# Patient Record
Sex: MALE | Race: WHITE | NOT HISPANIC OR LATINO | URBAN - METROPOLITAN AREA
[De-identification: names, ages, dates, MRNs, and addresses within clinical notes are randomized per-mention and may not be internally consistent; named-entity substitution may affect disease eponyms.]

---

## 2017-04-05 ENCOUNTER — IMPORTED ENCOUNTER (OUTPATIENT)
Dept: URBAN - METROPOLITAN AREA CLINIC 31 | Facility: CLINIC | Age: 82
End: 2017-04-05

## 2017-04-05 PROBLEM — H10.023: Noted: 2017-04-05

## 2017-04-05 PROCEDURE — 99214 OFFICE O/P EST MOD 30 MIN: CPT

## 2017-04-05 NOTE — PATIENT DISCUSSION
1.  Bacterial Conjunctivitis OU -- both lids are infected and OD lid lateral canthus is red and open sore. The condition was discussed with the patient. The mechanism of transmission was explained. The patient was advised to wash his hands and use separate towels and bedding. Rx Polytrim qid ou and bacitracin quiana to lids at bedtime and in corner of right eyelid.   2.  RTN 2 weeks for scheduled CE.

## 2017-04-18 ENCOUNTER — IMPORTED ENCOUNTER (OUTPATIENT)
Dept: URBAN - METROPOLITAN AREA CLINIC 31 | Facility: CLINIC | Age: 82
End: 2017-04-18

## 2017-04-18 PROBLEM — Z96.1: Noted: 2017-04-18

## 2017-04-18 PROBLEM — H43.391: Noted: 2017-04-18

## 2017-04-18 PROCEDURE — 92014 COMPRE OPH EXAM EST PT 1/>: CPT

## 2017-04-18 PROCEDURE — 92015 DETERMINE REFRACTIVE STATE: CPT

## 2017-04-18 NOTE — PATIENT DISCUSSION
1.  Psuedophakia OD - IOL stable. Monitor. 2. Floaters OD:  Patient was cautioned to call our office immediately if they experience a substantial change in their symptoms such as an increase in floaters persistent flashes loss of visual field (may appear as a shadow or a curtain) or decrease in visual acuity as these may indicate a retinal tear or detachment. If this is a new problem patient will need to return for re-examination  as determined by the [address] 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 Hx TIA. BP has been fine. 6.  Allergies/Dryness-  use theratears every day bid. 7.  NO change rx right eye.  make tint darker. 8.  Bacterial infection ou-  doing better. Cont with Polytrim bid till gone and eryth quiana to lids bedtime and lateral canthus. 9.  RTN 2 weeks for FU on infection. 10. Evan Tristan Return for an appointment in 6 months for DFTA.

## 2017-05-02 ENCOUNTER — IMPORTED ENCOUNTER (OUTPATIENT)
Dept: URBAN - METROPOLITAN AREA CLINIC 31 | Facility: CLINIC | Age: 82
End: 2017-05-02

## 2017-05-02 PROBLEM — H10.023: Noted: 2017-05-02

## 2017-05-02 PROCEDURE — 99213 OFFICE O/P EST LOW 20 MIN: CPT

## 2017-05-02 NOTE — PATIENT DISCUSSION
1.  Bacterial Conjunctivitis OU -- better. The condition was discussed with the patient. The mechanism of transmission was explained. The patient was advised to wash his hands and use separate towels and bedding. Finish Polytrim bid 5 days then DC. Use Bacitracin quiana to lids at bedtime and in corner of right eyelid 5 dasy then DC.    2.  RTN Oct CE

## 2017-11-07 ENCOUNTER — IMPORTED ENCOUNTER (OUTPATIENT)
Dept: URBAN - METROPOLITAN AREA CLINIC 31 | Facility: CLINIC | Age: 82
End: 2017-11-07

## 2017-11-07 PROBLEM — H43.391: Noted: 2017-11-07

## 2017-11-07 PROBLEM — Z96.1: Noted: 2017-11-07

## 2017-11-07 PROCEDURE — 92015 DETERMINE REFRACTIVE STATE: CPT

## 2017-11-07 PROCEDURE — 92014 COMPRE OPH EXAM EST PT 1/>: CPT

## 2017-11-07 NOTE — PATIENT DISCUSSION
1.  Psuedophakia OD - IOL stable. Open capsule Monitor. 2. Floaters OD:  Better per pt. Patient was cautioned to call our office immediately if they experience a substantial change in their symptoms such as an increase in floaters persistent flashes loss of visual field3. 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 Hx TIA. BP has been fine. 6.  Allergies/Dryness-  use theratears every day 2-3x per day. .  7.  NO change rx right eye.  make tint darker. 8.  Blepharitis-  Better today. pt has quiana if needed but not using.   9.  RTN 6 mths DFTA

## 2018-05-07 ENCOUNTER — IMPORTED ENCOUNTER (OUTPATIENT)
Dept: URBAN - METROPOLITAN AREA CLINIC 31 | Facility: CLINIC | Age: 83
End: 2018-05-07

## 2018-05-07 PROBLEM — Z96.1: Noted: 2018-05-07

## 2018-05-07 PROBLEM — H43.391: Noted: 2018-05-07

## 2018-05-07 PROCEDURE — 92014 COMPRE OPH EXAM EST PT 1/>: CPT

## 2018-05-07 PROCEDURE — 92015 DETERMINE REFRACTIVE STATE: CPT

## 2018-05-07 NOTE — PATIENT DISCUSSION
1.  Psuedophakia OD - IOL stable. Open capsule Monitor. 2. Floaters OD:  Better per pt. Patient was cautioned to call our office immediately if they experience a substantial change in their symptoms such as an increase in floaters persistent flashes loss of visual field3. 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 Hx TIA. BP has been fine. 6.  Allergies/Dryness-  use theratears every day 2-3x per day. .  7.  NO change rx right eye.  make tint darker. 8.  Blepharitis-  Better today. Rx eryth quiana qhs OS to lids.    9.  RTN 6 mths DFTA

## 2018-11-06 ENCOUNTER — IMPORTED ENCOUNTER (OUTPATIENT)
Dept: URBAN - METROPOLITAN AREA CLINIC 31 | Facility: CLINIC | Age: 83
End: 2018-11-06

## 2018-11-06 PROBLEM — Z96.1: Noted: 2018-11-06

## 2018-11-06 PROBLEM — H43.391: Noted: 2018-11-06

## 2018-11-06 PROCEDURE — 99214 OFFICE O/P EST MOD 30 MIN: CPT

## 2018-11-06 NOTE — PATIENT DISCUSSION
1.  Pseudophakia OD - IOL stable. Open capsule Monitor. 2. Floaters OD:  Better per pt. Patient was cautioned to call our office immediately if they experience a substantial change in their symptoms such as an increase in floaters persistent flashes loss of visual field3. 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 Hx TIA. BP has been fine. 6.  Allergies/Dryness-  use theratears every day 2-3x per day. .  7.  NO change rx right eye.  make tint darker. 8.  Blepharitis-  Better today. Rx eryth quiana qhs OS to lids.    9.  RTN 6 mths CE

## 2019-01-11 ENCOUNTER — IMPORTED ENCOUNTER (OUTPATIENT)
Dept: URBAN - METROPOLITAN AREA CLINIC 31 | Facility: CLINIC | Age: 84
End: 2019-01-11

## 2019-01-11 PROBLEM — H10.021: Noted: 2019-01-11

## 2019-01-11 PROCEDURE — 99214 OFFICE O/P EST MOD 30 MIN: CPT

## 2019-01-11 NOTE — PATIENT DISCUSSION
1.  Bacterial Conjunctivitis OD --The condition was discussed with the patient. The mechanism of transmission was explained. The patient was advised to wash his hands and use separate towels and bedding. Rx TDX tid OD and keep clean. 2.   RTN 10 days OC

## 2019-01-21 ENCOUNTER — IMPORTED ENCOUNTER (OUTPATIENT)
Dept: URBAN - METROPOLITAN AREA CLINIC 31 | Facility: CLINIC | Age: 84
End: 2019-01-21

## 2019-01-21 PROBLEM — H40.051: Noted: 2019-01-21

## 2019-01-21 PROBLEM — H10.021: Noted: 2019-01-21

## 2019-01-21 PROCEDURE — 99213 OFFICE O/P EST LOW 20 MIN: CPT

## 2019-01-21 NOTE — PATIENT DISCUSSION
1.  Ocular HTN OD:  Possibly steroid responder. Elevated intraocular pressure without signs of glaucomatous damage to the optic nerve. Will continue to monitor. 2. Bacterial Conjunctivitis OD --  better. The condition was discussed with the patient. The mechanism of transmission was explained. The patient was advised to wash his hands and use separate towels and bedding. Finish TDX bid OD 5 days then DC. .2. RTN 1 mth tack--increase possibly from steroid.

## 2019-02-21 ENCOUNTER — IMPORTED ENCOUNTER (OUTPATIENT)
Dept: URBAN - METROPOLITAN AREA CLINIC 31 | Facility: CLINIC | Age: 84
End: 2019-02-21

## 2019-02-21 PROBLEM — H10.021: Noted: 2019-02-21

## 2019-02-21 PROBLEM — H40.051: Noted: 2019-02-21

## 2019-02-21 PROBLEM — Z96.1: Noted: 2019-02-21

## 2019-02-21 PROBLEM — H43.391: Noted: 2019-02-21

## 2019-02-21 PROCEDURE — 99213 OFFICE O/P EST LOW 20 MIN: CPT

## 2019-02-21 NOTE — PATIENT DISCUSSION
1.  Pseudophakia OD - IOL stable. Open capsule Monitor. 2. Floaters OD:  Better per pt. Patient was cautioned to call our office immediately if they experience a substantial change in their symptoms such as an increase in floaters persistent flashes loss of visual field3. 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 Hx TIA. BP has been fine. 6.  Allergies/Dryness-  use theratears every day 2-3x per day. .  7.  NO change rx right eye.  make tint darker. 8.  Blepharitis-  Better today. Rx eryth quiana qhs OS to lids. 9. Ocular HTN OD:  Possibly steroid responder. Elevated intraocular pressure without signs of glaucomatous damage to the optic nerve. Will continue to monitor. Pt had 2 injectiosn in past week for back. 10.  Bacterial Conjunctivitis OD --  resolved--.11.  RTN 1 mth tack/VF--increase possibly from steroid. 12.  RTN 11/19 CE

## 2019-04-05 ENCOUNTER — IMPORTED ENCOUNTER (OUTPATIENT)
Dept: URBAN - METROPOLITAN AREA CLINIC 31 | Facility: CLINIC | Age: 84
End: 2019-04-05

## 2019-04-05 PROBLEM — H10.021: Noted: 2019-04-05

## 2019-04-05 PROBLEM — H40.051: Noted: 2019-04-05

## 2019-04-05 PROBLEM — H43.391: Noted: 2019-04-05

## 2019-04-05 PROBLEM — H04.123: Noted: 2019-04-05

## 2019-04-05 PROBLEM — Z96.1: Noted: 2019-04-05

## 2019-04-05 PROCEDURE — 99213 OFFICE O/P EST LOW 20 MIN: CPT

## 2019-04-05 PROCEDURE — 92083 EXTENDED VISUAL FIELD XM: CPT

## 2019-04-05 NOTE — PATIENT DISCUSSION
1.  Dry Eyes OU:  Start artificials tears bid or more. Encouraged regular use. 2.  Pseudophakia OD - IOL stable. Open capsule Monitor. 2. Floaters OD:  Better per pt. Patient was cautioned to call our office immediately if they experience a substantial change in their symptoms such as an increase in floaters persistent flashes loss of visual field3. 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 Hx TIA. BP has been fine. 6.  Allergies/Dryness-  use theratears every day 2-3x per day. .  7.  NO change rx right eye.  make tint darker. 8.  Blepharitis-  Better today. Rx eryth quiana qhs OS to lids. 9. Ocular HTN OD:  Possibly steroid responder. Elevated intraocular pressure without signs of glaucomatous damage to the optic nerve. Will continue to monitor. Pt had 2 injectiosn in past week for back. VF OD inf scattered- non specific. 10.  Bacterial Conjunctivitis OD --  resolved--.11.  RTN 1 mth  CE-- OCt in fall.  - increase from injection steroid. 12.  RTN 11/19  DF/OCT

## 2019-05-01 ENCOUNTER — IMPORTED ENCOUNTER (OUTPATIENT)
Dept: URBAN - METROPOLITAN AREA CLINIC 31 | Facility: CLINIC | Age: 84
End: 2019-05-01

## 2019-05-01 PROBLEM — H43.391: Noted: 2019-05-01

## 2019-05-01 PROBLEM — H04.123: Noted: 2019-05-01

## 2019-05-01 PROBLEM — H40.051: Noted: 2019-05-01

## 2019-05-01 PROBLEM — Z96.1: Noted: 2019-05-01

## 2019-05-01 PROBLEM — H10.021: Noted: 2019-05-01

## 2019-05-01 PROCEDURE — 92015 DETERMINE REFRACTIVE STATE: CPT

## 2019-05-01 PROCEDURE — 92014 COMPRE OPH EXAM EST PT 1/>: CPT

## 2019-05-01 PROCEDURE — 92250 FUNDUS PHOTOGRAPHY W/I&R: CPT

## 2019-05-01 NOTE — PATIENT DISCUSSION
1.  Dry Eyes OU:  Cont artificials tears bid or more. Encouraged regular use. 2.  Pseudophakia OD - IOL stable. Open capsule Monitor. 2. Floaters OD:  Better per pt. Patient was cautioned to call our office immediately if they experience a substantial change in their symptoms such as an increase in floaters persistent flashes loss of visual field3. 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 Hx TIA. BP has been fine. 6.  Allergies/Dryness-  use theratears every day 2-3x per day. .  7.  NO change rx right eye.  make tint darker. 8.  Blepharitis-  Better today. wash lids bid. Can use eryth quiana qhs OS to lids. 9. Ocular HTN OD:  Possibly steroid responder. Elevated intraocular pressure without signs of glaucomatous damage to the optic nerve. Will continue to monitor. Pt had 2 injectiosn in past week for back. VF OD 4/5/19   inf scattered- non specific.  10. RTN 11/19  DF/OCT

## 2019-12-03 ENCOUNTER — IMPORTED ENCOUNTER (OUTPATIENT)
Dept: URBAN - METROPOLITAN AREA CLINIC 31 | Facility: CLINIC | Age: 84
End: 2019-12-03

## 2019-12-03 PROBLEM — H04.123: Noted: 2019-12-03

## 2019-12-03 PROBLEM — H40.051: Noted: 2019-12-03

## 2019-12-03 PROBLEM — H43.391: Noted: 2019-12-03

## 2019-12-03 PROBLEM — Z96.1: Noted: 2019-12-03

## 2019-12-03 PROBLEM — H10.021: Noted: 2019-12-03

## 2019-12-03 PROCEDURE — 99214 OFFICE O/P EST MOD 30 MIN: CPT

## 2019-12-03 PROCEDURE — 92133 CPTRZD OPH DX IMG PST SGM ON: CPT

## 2019-12-03 NOTE — PATIENT DISCUSSION
1.  Dry Eyes OU:  Cont artificials tears bid or more. Encouraged regular use. 2.  Pseudophakia OD - IOL stable. Open capsule Monitor. 2. Floaters OD:  Better per pt. Patient was cautioned to call our office immediately if they experience a substantial change in their symptoms such as an increase in floaters persistent flashes loss of visual field3. 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 Hx TIA. BP has been fine. 6.  Allergies/Dryness-  use theratears every day 2-3x per day. .  7.  NO change rx right eye.  make tint darker. 8.  Blepharitis-  Better today. wash lids bid. Can use eryth quiana qhs OS to lids. 9. Ocular HTN OD:   ONH damage--  OCT 12/3/19 moderate thinning oD. RNFL 66. Poor GCC . Possibly steroid responder. Elevated intraocular pressure with signs of glaucomatous damage to the optic nerve. Rx Latanprost qhs OD. Pt had 2 injectiosn in past week for back. VF OD 4/5/19   inf scattered- non specific. 10. RTN 1 mth tack11.   RTN 5/20  CE/VF/optos --derick FU on ONH and glc

## 2020-01-07 ENCOUNTER — IMPORTED ENCOUNTER (OUTPATIENT)
Dept: URBAN - METROPOLITAN AREA CLINIC 31 | Facility: CLINIC | Age: 85
End: 2020-01-07

## 2020-01-07 PROBLEM — H43.391: Noted: 2020-01-07

## 2020-01-07 PROBLEM — Z96.1: Noted: 2020-01-07

## 2020-01-07 PROBLEM — H10.021: Noted: 2020-01-07

## 2020-01-07 PROBLEM — H40.051: Noted: 2020-01-07

## 2020-01-07 PROBLEM — H04.123: Noted: 2020-01-07

## 2020-01-07 PROCEDURE — 99213 OFFICE O/P EST LOW 20 MIN: CPT

## 2020-01-07 NOTE — PATIENT DISCUSSION
1.  Dry Eyes OU:  Cont artificials tears bid or more. Encouraged regular use. Can try Zaditor bid for tearing. 2.  Pseudophakia OD - IOL stable. Open capsule Monitor. 2. Floaters OD:  Better per pt. Patient was cautioned to call our office immediately if they experience a substantial change in their symptoms such as an increase in floaters persistent flashes loss of visual field3. 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 Hx TIA. BP has been fine. 6.  Allergies/Dryness-  use theratears every day 2-3x per day. .  7.  NO change rx right eye.  make tint darker. 8.  Blepharitis-  Better today. wash lids bid. Can use eryth quiana qhs OS to lids. 9. Ocular HTN OD:  IOP better with Latanaprost down to 20 from 28. ONH damage--  OCT 12/3/19 moderate thinning oD. RNFL 66. Poor GCC . Possibly steroid responder. Elevated intraocular pressure with signs of glaucomatous damage to the optic nerve. Cont Latanprost qhs OD. Pt had 2 injectiosn in past week for back. VF OD 4/5/19   inf scattered- non specific.  10.   RTN 5/20  CE/VF/optos --derick FU on ONH and glc

## 2020-05-12 ENCOUNTER — IMPORTED ENCOUNTER (OUTPATIENT)
Dept: URBAN - METROPOLITAN AREA CLINIC 31 | Facility: CLINIC | Age: 85
End: 2020-05-12

## 2020-05-12 PROBLEM — H40.051: Noted: 2020-05-12

## 2020-05-12 PROBLEM — Z96.1: Noted: 2020-05-12

## 2020-05-12 PROBLEM — H43.391: Noted: 2020-05-12

## 2020-05-12 PROBLEM — H04.123: Noted: 2020-05-12

## 2020-05-12 PROBLEM — H01.004: Noted: 2020-05-12

## 2020-05-12 PROBLEM — H10.021: Noted: 2020-05-12

## 2020-05-12 PROBLEM — H01.002: Noted: 2020-05-12

## 2020-05-12 PROBLEM — H01.005: Noted: 2020-05-12

## 2020-05-12 PROBLEM — H01.001: Noted: 2020-05-12

## 2020-05-12 PROCEDURE — 92014 COMPRE OPH EXAM EST PT 1/>: CPT

## 2020-05-12 PROCEDURE — 92015 DETERMINE REFRACTIVE STATE: CPT

## 2020-05-12 PROCEDURE — 92250 FUNDUS PHOTOGRAPHY W/I&R: CPT

## 2020-05-12 PROCEDURE — 92083 EXTENDED VISUAL FIELD XM: CPT

## 2020-05-12 NOTE — PATIENT DISCUSSION
Blepharitis anterior type OU - The patient exhibits reddened crusty eyelid margins. Warm compresses and lid scrubs were recommended. Antibiotic quiana to lid margin qhs. Artificial Tears to be used as needed for discomfort.

## 2020-05-12 NOTE — PATIENT DISCUSSION
1.  Dry Eyes OU:  Cont artificials tears bid or more. Encouraged regular use. Can try Zaditor bid for tearing. 2.  Pseudophakia OD - IOL stable. Open capsule Monitor. 2. Floaters OD:  Better per pt. Patient was cautioned to call our office immediately if they experience a substantial change in their symptoms such as an increase in floaters persistent flashes loss of visual field3. 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 Hx TIA. BP has been fine. 6.  Allergies/Dryness-  use theratears every day 2-3x per day. .  7.  NO change rx right eye.  make tint darker. 8.  Blepharitis-   wash lids bid. RX eryth quiana qhs OU to lids. 9. Ocular HTN OD:  IOP better with Latanaprost down to 20 from 28. ONH damage--  OCT 12/3/19 moderate thinning oD. RNFL 66. Poor GCC . Possibly steroid responder. Elevated intraocular pressure with signs of glaucomatous damage to the optic nerve. Cont Latanprost qhs OD. Pt had 2 injectiosn in past week for back. VF OD 5/12/20   inf scattered- non specific. 10.   RTN 11/20  DF/OCT [id number].  RTN 1 yr  CE/VF/optos --derick FU on ONH and glc

## 2020-11-20 ENCOUNTER — IMPORTED ENCOUNTER (OUTPATIENT)
Dept: URBAN - METROPOLITAN AREA CLINIC 31 | Facility: CLINIC | Age: 85
End: 2020-11-20

## 2020-11-20 PROBLEM — H10.021: Noted: 2020-11-20

## 2020-11-20 PROBLEM — H04.123: Noted: 2020-11-20

## 2020-11-20 PROBLEM — H43.391: Noted: 2020-11-20

## 2020-11-20 PROBLEM — H40.051: Noted: 2020-11-20

## 2020-11-20 PROBLEM — Z96.1: Noted: 2020-11-20

## 2020-11-20 PROCEDURE — 99214 OFFICE O/P EST MOD 30 MIN: CPT

## 2020-11-20 PROCEDURE — 92133 CPTRZD OPH DX IMG PST SGM ON: CPT

## 2020-11-20 NOTE — PATIENT DISCUSSION
1.  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 Zaditor bid. Hx Dry Eyes/Allergies-- OU:  Encouraged regular use. 2.  Pseudophakia OD - IOL stable. Open capsule Monitor. 3. Floaters OD:  Better per pt. Patient was cautioned to call our office immediately if they experience a substantial change in their symptoms such as an increase in floaters persistent flashes loss of visual field4. Pthisis Bulbi OS- from trauma age 9.7.  s/p Pterygium excision OD 6/046. Hx TIA. BP has been fine. 7.  NO change rx right eye.  make tint darker. 8.  Blepharitis-   wash lids bid. RX eryth quiana qhs OU to lids. 9. Ocular HTN OD:  IOP still elevated with Latanaprost.  24 today. ONH damage--  OCT 11/20/20 thinning oD. RNFL 66. Poor GCC . Possibly steroid responder. Elevated intraocular pressure with signs of glaucomatous damage to the optic nerve. Change rx to Timoptic .5% q12h OD.  VF OD 5/12/20   inf scattered- non specific. 10. RTN  1 mth OC/tack--FU on pressure and [id number].   RTN 6 mths CE/VF/optos -- FU on ONH and glc

## 2020-12-18 ENCOUNTER — IMPORTED ENCOUNTER (OUTPATIENT)
Dept: URBAN - METROPOLITAN AREA CLINIC 31 | Facility: CLINIC | Age: 85
End: 2020-12-18

## 2020-12-18 PROBLEM — H43.391: Noted: 2020-12-18

## 2020-12-18 PROBLEM — H40.051: Noted: 2020-12-18

## 2020-12-18 PROBLEM — H04.123: Noted: 2020-12-18

## 2020-12-18 PROBLEM — H10.021: Noted: 2020-12-18

## 2020-12-18 PROBLEM — Z96.1: Noted: 2020-12-18

## 2020-12-18 PROCEDURE — 99213 OFFICE O/P EST LOW 20 MIN: CPT

## 2020-12-18 NOTE — PATIENT DISCUSSION
1.  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 Zaditor bid. Hx Dry Eyes/Allergies-- OU:  Encouraged regular use. 2.  Pseudophakia OD - IOL stable. Open capsule Monitor. 3. Floaters OD:  Better per pt. Patient was cautioned to call our office immediately if they experience a substantial change in their symptoms such as an increase in floaters persistent flashes loss of visual field4. Pthisis Bulbi OS- from trauma age 9.7.  s/p Pterygium excision OD 6/046. Hx TIA. BP has been fine. 7.  NO change rx right eye.  make tint darker. 8.  Blepharitis-   wash lids bid. RX eryth quiana qhs OU to lids. 9. Ocular HTN OD:  IOP better with Vincenzo Javier . 5% q12h OD. ONH damage--  OCT 11/20/20 thinning oD. RNFL 66. Poor GCC . Possibly steroid responder. Elevated intraocular pressure with signs of glaucomatous damage to the optic nerve. Cont Timoptic .5% q12h OD.  VF OD 5/12/20   inf scattered- non specific.  10. RTN 5/21 CE/VF/optos -- FU on ONH and glc

## 2021-04-27 ENCOUNTER — IMPORTED ENCOUNTER (OUTPATIENT)
Dept: URBAN - METROPOLITAN AREA CLINIC 31 | Facility: CLINIC | Age: 86
End: 2021-04-27

## 2021-04-27 PROBLEM — Z96.1: Noted: 2021-04-27

## 2021-04-27 PROBLEM — H04.123: Noted: 2021-04-27

## 2021-04-27 PROBLEM — H10.021: Noted: 2021-04-27

## 2021-04-27 PROBLEM — H43.391: Noted: 2021-04-27

## 2021-04-27 PROBLEM — H40.1111: Noted: 2021-04-27

## 2021-04-27 PROBLEM — H40.051: Noted: 2021-04-27

## 2021-04-27 PROCEDURE — 92083 EXTENDED VISUAL FIELD XM: CPT

## 2021-04-27 PROCEDURE — 92250 FUNDUS PHOTOGRAPHY W/I&R: CPT

## 2021-04-27 PROCEDURE — 92015 DETERMINE REFRACTIVE STATE: CPT

## 2021-04-27 PROCEDURE — 92014 COMPRE OPH EXAM EST PT 1/>: CPT

## 2021-04-27 NOTE — PATIENT DISCUSSION
1.  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 Zaditor bid. Hx Dry Eyes/Allergies-- OU:  Encouraged regular use. 2.  Pseudophakia OD - IOL stable. Open capsule Monitor. 3. Floaters OD:  Better per pt. Patient was cautioned to call our office immediately if they experience a substantial change in their symptoms such as an increase in floaters persistent flashes loss of visual field4. Pthisis Bulbi OS- from trauma age 9.7.  s/p Pterygium excision OD 6/046. Hx TIA. BP has been fine. 7.  NO change rx right eye.  make tint darker. 8.  Blepharitis-   wash lids bid. RX eryth quiana qhs OU to lids. 9. Ocular HTN OD:  IOP good with Brimonidine . 2% bid. ONH damage--  OCT 11/20/20 thinning oD. RNFL 66. Poor GCC . Possibly steroid responder. Elevated intraocular pressure with signs of glaucomatous damage to the optic nerve. Dorothy Najera caused his HR to lower. VF OD 4/27/21  stable from 2020. inf scattered- non specific.  10. RTN 11/21 DF/OCT nerve -- FU on ONH and glc

## 2021-11-17 ENCOUNTER — IMPORTED ENCOUNTER (OUTPATIENT)
Dept: URBAN - METROPOLITAN AREA CLINIC 31 | Facility: CLINIC | Age: 86
End: 2021-11-17

## 2021-11-17 PROBLEM — H40.1112: Noted: 2021-11-17

## 2021-11-17 PROBLEM — H04.123: Noted: 2021-11-17

## 2021-11-17 PROBLEM — H43.391: Noted: 2021-11-17

## 2021-11-17 PROBLEM — Z96.1: Noted: 2021-11-17

## 2021-11-17 PROBLEM — H40.051: Noted: 2021-11-17

## 2021-11-17 PROBLEM — H10.021: Noted: 2021-11-17

## 2021-11-17 PROCEDURE — 99214 OFFICE O/P EST MOD 30 MIN: CPT

## 2021-11-17 PROCEDURE — 92133 CPTRZD OPH DX IMG PST SGM ON: CPT

## 2021-11-17 NOTE — PATIENT DISCUSSION
1.  Ocular HTN OD:  IOP good with Brimonidine . 2% bid. ONH damage--  OCT 11/17/21 thinning OD. RNFL 52 from 66. Poor GCC . Possibly steroid responder. Elevated intraocular pressure with signs of glaucomatous damage to the optic nerve. Timolol caused his HR to lower. VF OD 4/27/21  stable from 2020. inf scattered- non specific. 2.  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 Zaditor bid. Hx Dry Eyes/Allergies-- OU:  Encouraged regular use. 3.  Pseudophakia OD - IOL stable. Open capsule Monitor. 4. Floaters OD:  Better per pt. Patient was cautioned to call our office immediately if they experience a substantial change in their symptoms such as an increase in floaters persistent flashes loss of visual field5. Pthisis Bulbi OS- from trauma age 11.7.  s/p Pterygium excision OD 6/047. Hx TIA. BP has been fine. 8.  NO change rx right eye.  make tint darker. 9.  Blepharitis-   wash lids bid. RX eryth quiana qhs OU to lids.    10.  RTN 5 mths CE/VF/OPTOS--FU on ONH and GLC

## 2022-03-22 ENCOUNTER — IMPORTED ENCOUNTER (OUTPATIENT)
Dept: URBAN - METROPOLITAN AREA CLINIC 31 | Facility: CLINIC | Age: 87
End: 2022-03-22

## 2022-03-22 PROBLEM — H40.1111: Noted: 2022-03-22

## 2022-03-22 PROBLEM — H43.391: Noted: 2022-03-22

## 2022-03-22 PROBLEM — H10.021: Noted: 2022-03-22

## 2022-03-22 PROBLEM — Z96.1: Noted: 2022-03-22

## 2022-03-22 PROBLEM — H40.051: Noted: 2022-03-22

## 2022-03-22 PROBLEM — H04.123: Noted: 2022-03-22

## 2022-03-22 PROCEDURE — 92250 FUNDUS PHOTOGRAPHY W/I&R: CPT

## 2022-03-22 PROCEDURE — 92083 EXTENDED VISUAL FIELD XM: CPT

## 2022-03-22 PROCEDURE — 92015 DETERMINE REFRACTIVE STATE: CPT

## 2022-03-22 PROCEDURE — 99214 OFFICE O/P EST MOD 30 MIN: CPT

## 2022-03-22 NOTE — PATIENT DISCUSSION
1.  Ocular HTN OD:  IOP good with Brimonidine . 2% bid. ONH damage--  OCT 11/17/21 thinning OD. RNFL 52 from 66. Poor GCC . Possibly steroid responder. Elevated intraocular pressure with signs of glaucomatous damage to the optic nerve. Timolol caused his HR to lower. VF OD 3/22/22  stable from 2020. inf scattered- non specific. 2.  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 Zaditor bid. Hx Dry Eyes/Allergies-- OU:  Encouraged regular use. 3.  Pseudophakia OD - IOL stable. Open capsule Monitor. 4. Floaters OD:  Better per pt. Patient was cautioned to call our office immediately if they experience a substantial change in their symptoms such as an increase in floaters persistent flashes loss of visual field5. Pthisis Bulbi OS- from trauma age 11.7.  s/p Pterygium excision OD 6/047. Hx TIA. BP has been fine. 8.  NO change rx right eye.  make tint darker. 9.  Blepharitis-   wash lids bid. RX eryth quiana qhs OU to lids.    10.  RTN 11/22  DF/OCT nerve---FU on ONH and GLC

## 2022-04-01 ASSESSMENT — TONOMETRY
OD_IOP_MMHG: 20
OD_IOP_MMHG: 17
OD_IOP_MMHG: 28
OD_IOP_MMHG: 22
OD_IOP_MMHG: 27
OD_IOP_MMHG: 27
OD_IOP_MMHG: 23
OD_IOP_MMHG: 21
OD_IOP_MMHG: 22
OD_IOP_MMHG: 20
OD_IOP_MMHG: 22
OD_IOP_MMHG: 24
OD_IOP_MMHG: 20
OD_IOP_MMHG: 23
OD_IOP_MMHG: 17
OS_IOP_MMHG: 17
OD_IOP_MMHG: 22

## 2022-04-01 ASSESSMENT — VISUAL ACUITY
OD_CC: 20/30-1
OD_SC: 20/40
OD_SC: 20/30+1
OD_CC: 20/25+3
OD_SC: 20/50
OD_SC: 20/30-1
OD_SC: 20/30-2
OD_CC: 20/20
OD_SC: 20/40
OD_SC: 20/40-1
OD_CC: 20/40-2
OD_SC: 20/40+2
OD_CC: 20/30

## 2022-07-30 ENCOUNTER — TELEPHONE ENCOUNTER (OUTPATIENT)
Age: 87
End: 2022-07-30

## 2022-07-31 ENCOUNTER — TELEPHONE ENCOUNTER (OUTPATIENT)
Age: 87
End: 2022-07-31